# Patient Record
Sex: FEMALE
[De-identification: names, ages, dates, MRNs, and addresses within clinical notes are randomized per-mention and may not be internally consistent; named-entity substitution may affect disease eponyms.]

---

## 2018-03-06 ENCOUNTER — HOSPITAL ENCOUNTER (OUTPATIENT)
Dept: HOSPITAL 42 - ED | Age: 63
Setting detail: OBSERVATION
LOS: 2 days | Discharge: HOME | End: 2018-03-08
Attending: INTERNAL MEDICINE | Admitting: INTERNAL MEDICINE
Payer: COMMERCIAL

## 2018-03-06 VITALS — BODY MASS INDEX: 29.2 KG/M2

## 2018-03-06 DIAGNOSIS — R13.10: ICD-10-CM

## 2018-03-06 DIAGNOSIS — T18.128A: Primary | ICD-10-CM

## 2018-03-06 DIAGNOSIS — Z82.3: ICD-10-CM

## 2018-03-06 DIAGNOSIS — E04.1: ICD-10-CM

## 2018-03-06 DIAGNOSIS — R40.2412: ICD-10-CM

## 2018-03-06 DIAGNOSIS — F17.210: ICD-10-CM

## 2018-03-06 DIAGNOSIS — R91.1: ICD-10-CM

## 2018-03-06 DIAGNOSIS — J45.909: ICD-10-CM

## 2018-03-06 PROCEDURE — 74220 X-RAY XM ESOPHAGUS 1CNTRST: CPT

## 2018-03-06 PROCEDURE — 99285 EMERGENCY DEPT VISIT HI MDM: CPT

## 2018-03-06 PROCEDURE — 80053 COMPREHEN METABOLIC PANEL: CPT

## 2018-03-06 PROCEDURE — 85610 PROTHROMBIN TIME: CPT

## 2018-03-06 PROCEDURE — 96367 TX/PROPH/DG ADDL SEQ IV INF: CPT

## 2018-03-06 PROCEDURE — 85730 THROMBOPLASTIN TIME PARTIAL: CPT

## 2018-03-06 PROCEDURE — 85027 COMPLETE CBC AUTOMATED: CPT

## 2018-03-06 PROCEDURE — 36415 COLL VENOUS BLD VENIPUNCTURE: CPT

## 2018-03-06 PROCEDURE — 85025 COMPLETE CBC W/AUTO DIFF WBC: CPT

## 2018-03-06 PROCEDURE — 96365 THER/PROPH/DIAG IV INF INIT: CPT

## 2018-03-06 PROCEDURE — 96366 THER/PROPH/DIAG IV INF ADDON: CPT

## 2018-03-06 PROCEDURE — 93005 ELECTROCARDIOGRAM TRACING: CPT

## 2018-03-06 PROCEDURE — 96375 TX/PRO/DX INJ NEW DRUG ADDON: CPT

## 2018-03-06 PROCEDURE — 70490 CT SOFT TISSUE NECK W/O DYE: CPT

## 2018-03-06 PROCEDURE — 71260 CT THORAX DX C+: CPT

## 2018-03-06 NOTE — ED PDOC
Arrival/HPI





- General


Chief Complaint: Shortness Of Breath


Time Seen by Provider: 18 23:19


Historian: Patient





- History of Present Illness


Narrative History of Present Illness (Text): 


18 23:34


Mine Pina is a 62 year old female who presents to the Emergency 

department complaining of a foreign body sensation in her throat tonight. 

Patient states she was eating a steak at 22:15 tonight, when a piece became 

stuck in her throat. Patient unable to swallow her own spit or liquids. Patient 

states this has happened to her previously. Patient denies any fever, chills, 

chest pain, shortness of breath, neck pain, headache, dizziness, or any other 

complaints.





Time/Duration: 1-3 hours


Symptom Onset: Sudden


Symptom Course: Unchanged


Activities at Onset: Eating


Context: Home





Past Medical History





- Provider Review


Nursing Documentation Reviewed: Yes





- Infectious Disease


Hx of Infectious Diseases: None





- Pulmonary


Hx Asthma: Yes





- Psychiatric


Hx Substance Use: No





- Surgical History


Hx  Section: Yes (x1)





Family/Social History





- Physician Review


Nursing Documentation Reviewed: Yes


Family/Social History: Unknown Family HX


Smoking Status: Light Smoker < 10 Cigarettes Daily


Hx Alcohol Use: Yes


Frequency of alcohol use: Socially


Hx Substance Use: No





Allergies/Home Meds


Allergies/Adverse Reactions: 


Allergies





No Known Allergies Allergy (Verified 18 23:03)


 








Home Medications: 


 Home Meds











 Medication  Instructions  Recorded  Confirmed


 


No Known Home Med  18














Review of Systems





- Physician Review


All systems were reviewed & negative as marked: Yes





- Review of Systems


Constitutional: Normal.  absent: Fevers


Eyes: Normal


ENT: Other (+foreign body sensation in throat)


Respiratory: Normal.  absent: SOB, Cough


Cardiovascular: Normal.  absent: Chest Pain


Gastrointestinal: Normal.  absent: Abdominal Pain, Diarrhea, Nausea, Vomiting


Genitourinary Female: Normal.  absent: Dysuria, Frequency, Hematuria, Urine 

Output Changes


Musculoskeletal: Normal.  absent: Back Pain


Skin: Normal.  absent: Rash


Neurological: Normal.  absent: Headache, Dizziness


Endocrine: Normal


Hemo/Lymphatic: Normal


Psychiatric: Normal





Physical Exam


Vital Signs Reviewed: Yes


Vital Signs











  Temp Pulse Resp BP Pulse Ox


 


 18 23:54  98.3 F  79  18  145/75  100











Temperature: Afebrile


Blood Pressure: Normal


Pulse: Regular


Respiratory Rate: Normal


Appearance: Positive for: Well-Appearing, Non-Toxic, Comfortable


Pain Distress: None


Mental Status: Positive for: Alert and Oriented X 3





- Systems Exam


Head: Present: Atraumatic, Normocephalic


Pupils: Present: PERRL


Extroacular Muscles: Present: EOMI


Conjunctiva: Present: Normal


Mouth: Present: Moist Mucous Membranes


Pharnyx: Present: Normal.  No: ERYTHEMA, EXUDATE, TONSILS ENLARGED, 

Peritonsilar Swelling, Uvular Deviation, Muffled/Hoarse Voice, Strider, Soft 

Palate/Uvular Edema


Neck: Present: Normal Range of Motion.  No: Meningeal Signs, MIDLINE TENDERNESS

, Paraspinal Tenderness


Respiratory/Chest: Present: Clear to Auscultation, Good Air Exchange.  No: 

Respiratory Distress, Accessory Muscle Use


Cardiovascular: Present: Regular Rate and Rhythm, Normal S1, S2.  No: Murmurs


Abdomen: Present: Normal Bowel Sounds.  No: Tenderness, Distention, Peritoneal 

Signs


Back: Present: Normal Inspection.  No: CVA Tenderness, Midline Tenderness, 

Paraspinal Tenderness


Upper Extremity: Present: Normal Inspection.  No: Cyanosis, Edema


Lower Extremity: Present: Normal Inspection.  No: Edema


Neurological: Present: GCS=15, CN II-XII Intact, Speech Normal


Skin: Present: Warm, Dry, Normal Color.  No: Rashes


Psychiatric: Present: Alert, Oriented x 3, Normal Insight, Normal Concentration





Medical Decision Making


ED Course and Treatment: 


18 23:34


Impression:


62 year old female complaining of foreign body sensation in throat after eating 

at 22:15 tonight.





Plan:


-- Labs


-- IV fluids


-- Glucagon


-- Reassess and disposition








Progress Notes:


18 00:36


Case discussed with Dr. Larson, covering for Dr. Staley, who is aware and agrees 

with plan. 





18 00:45


On re-evaluation, pt states she was able to swallow the food bolus. Pt now able 

to tolerate PO intake without difficulty. Paged Dr. Larson.





18 00:50


Spoke again with Dr. Larson, recommends pt stay overnight for further 

evaluation. Pt agrees with plan. Pt will undergo CT Neck Soft Tissues in the 

interim.





18 02:46


Case discussed with medical resident on call, who is aware and agrees with 

plan. 


Case discussed with Dr. Prater, who is aware and agrees with plan. Accepts pt in 

to hospitalist service.





18 03:24


CT Neck Soft Tissues shows:


Nasopharynx: No acute abnormality.


Oropharynx: No significant tonsillar enlargement.


Hypopharynx: No acute abnormality.


Larynx: Normal epiglottis.


Trachea: Posterior lateral to the trachea, there is a collection of gas 

measuring 0.9 x 1.0 cm,


consistent with a paratracheal air cyst.


Retropharyngeal space: No acute abnormality.


Submandibular/parotid glands: Subcentimeter intraparotid lymph nodes are 

visualized. No


significant swelling of the submandibular or parotid glands.


Thyroid: Within the right thyroid lobe, there is a 0.9 x 0.9 x 1.2 cm hypodense 

nodule.


Bones/joints: Spondylosis is visualized at multiple cervical levels. There is 

slight reversal of the


lordotic curvature of the cervical spine.


Soft tissues: No hyperdense foreign body is identified within the visualized 

aerodigestive tract.


Vasculature: Evaluation of the vasculature is suboptimal due the absence of 

intravenous contrast.


Mild atherosclerotic changes are visualized.


Lymph nodes: Scattered small cervical lymph nodes are identified, without 

significant cervical


lymphadenopathy.


Esophagus: There is a subcentimeter focus of gas posterior to the proximal 

esophagus. Differential


considerations include infection and perforation.


Lung apices: Patchy nonspecific groundglass densities identified within the 

lungs bilaterally.


IMPRESSION:


1. No hyperdense foreign body is identified within the visualized aerodigestive 

tract.


2. There is a subcentimeter focus of gas posterior to the proximal esophagus. 

Differential


considerations include infection and perforation. Clinical correlation and 

possible upper endoscopy is


recommended.


3. Within the right thyroid lobe, there is a 0.9 x 0.9 x 1.2 cm hypodense 

nodule. Nonemergent


ultrasonography is recommended.


4. Posterolateral to the trachea, there is a collection of gas measuring 0.9 x 

1.0 cm, consistent with a


paratracheal air cyst.


5. Incidental/non-acute findings are described above.





18 03:58


Dr. Ronquillo made aware of CT scan results. Pt will undergo endoscopy later today.





- Lab Interpretations


Lab Results: 








 18 00:10 





 18 00:10 





 Lab Results





18 00:10: WBC 9.1, RBC 4.24, Hgb 14.1, Hct 42.3, MCV 99.8, MCH 33.3, MCHC 

33.3, RDW 13.7, Plt Count 261, MPV 11.5 H


18 00:10: Sodium 143, Potassium 3.9, Chloride 110 H, Carbon Dioxide 22, 

Anion Gap 15, BUN 9, Creatinine 1.2, Est GFR (African Amer) 55, Est GFR (Non-Af 

Amer) 46, Random Glucose 128 H, Calcium 9.6, Total Bilirubin 0.3, AST 22, ALT 32

, Alkaline Phosphatase 63, Total Protein 7.3, Albumin 4.0, Globulin 3.3, Albumin

/Globulin Ratio 1.2


18 00:10: PT 11.8, INR 1.03, APTT 28.7








I have reviewed the lab results: Yes





- RAD Interpretation


: Radiologist





- Medication Orders


Current Medication Orders: 








Acetaminophen (Tylenol 325mg Tab)  650 mg PO Q6H PRN


   PRN Reason: Fever >100.4 F


Sodium Chloride (Sodium Chloride 0.9%)  1,000 mls @ 100 mls/hr IV .Q10H [facility]


   Last Admin: 18 00:03  Dose: 100 mls/hr





eMAR Start Stop


 Document     18 00:03  AD  (Rec: 18 00:04  AD  [id number])


     Intravenous Solution


      Start Date                                 18


      Start Time                                 00:04








Discontinued Medications





Glucagon (Glucagen Diagnostic Kit)  1 mg IV STAT STA


   Stop: 18 23:39


   Last Admin: 18 00:06  Dose: 1 mg





eMAR Start Stop


 Document     18 00:06  AD  (Rec: 18 00:06  AD  [id number])


     Intravenous Solution


      Start Date                                 18


      Start Time                                 00:06














- Scribe Statement


The provider has reviewed the documentation as recorded by the Hola Last





Provider Scribe Attestation:


All medical record entries made by the Scribe were at my direction and 

personally dictated by me. I have reviewed the chart and agree that the record 

accurately reflects my personal performance of the history, physical exam, 

medical decision making, and the department course for this patient. I have 

also personally directed, reviewed, and agree with the discharge instructions 

and disposition.








Disposition/Present on Arrival





- Present on Arrival


Any Indicators Present on Arrival: No


History of DVT/PE: No


History of Uncontrolled Diabetes: No


Urinary Catheter: No


History of Decub. Ulcer: No


History Surgical Site Infection Following: None





- Disposition


Have Diagnosis and Disposition been Completed?: Yes


Diagnosis: 


 Food impaction of esophagus





Disposition: HOSPITALIZED


Disposition Time: 02:50


Patient Plan: Observation


Patient Problems: 


 Current Active Problems











Problem Status Onset


 


Food impaction of esophagus Acute  











Condition: STABLE

## 2018-03-07 VITALS — TEMPERATURE: 98.1 F

## 2018-03-07 LAB
ALBUMIN SERPL-MCNC: 4 G/DL (ref 3–4.8)
ALBUMIN/GLOB SERPL: 1.2 {RATIO} (ref 1.1–1.8)
ALT SERPL-CCNC: 32 U/L (ref 7–56)
APTT BLD: 28.7 SECONDS (ref 25.1–36.5)
AST SERPL-CCNC: 22 U/L (ref 14–36)
BUN SERPL-MCNC: 9 MG/DL (ref 7–21)
CALCIUM SERPL-MCNC: 9.6 MG/DL (ref 8.4–10.5)
ERYTHROCYTE [DISTWIDTH] IN BLOOD BY AUTOMATED COUNT: 13.7 % (ref 11.5–14.5)
GFR NON-AFRICAN AMERICAN: 46
HGB BLD-MCNC: 14.1 G/DL (ref 12–16)
INR PPP: 1.03 (ref 0.93–1.08)
MCH RBC QN AUTO: 33.3 PG (ref 25–35)
MCHC RBC AUTO-ENTMCNC: 33.3 G/DL (ref 31–37)
MCV RBC AUTO: 99.8 FL (ref 80–105)
PLATELET # BLD: 261 10^3/UL (ref 120–450)
PMV BLD AUTO: 11.5 FL (ref 7–11)
PROTHROMBIN TIME: 11.8 SECONDS (ref 9.4–12.5)
RBC # BLD AUTO: 4.24 10^6/UL (ref 3.5–6.1)
WBC # BLD AUTO: 9.1 10^3/UL (ref 4.5–11)

## 2018-03-07 RX ADMIN — POTASSIUM CHLORIDE, DEXTROSE MONOHYDRATE AND SODIUM CHLORIDE SCH MLS/HR: 150; 5; 450 INJECTION, SOLUTION INTRAVENOUS at 16:33

## 2018-03-07 NOTE — CP.PCM.CON
<Felecia Larson - Last Filed: 18 18:49>





History of Present Illness





- History of Present Illness


History of Present Illness: 


GI Fellow PGY 4 Consult Note


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 

presents for steak stuck in her throat last night. Pt states that she was 

eating steak at 10:15 pm, when a piece got stuck in her throat. Pt unable to 

swallow her saliva or liquids, denies respiratory distress, nausea, vomiting, 

abdominal pain, diarrhea, hemoptysis, fever, chills, cp, sob, urinary symptoms. 

Pt reports prior such episodes, last one was last year which did not require 

EGD but got better after glugacon. Pt reports she needed and EGD in [place] 

for similar episode a few years ago. Pt was able to swallow steak in the ER not 

requiring emergent EGD. CT soft tissue neck significant for a subcentimeter 

focus of gas posterior to the proximal esophagus. GI consulted, recommend 

overnight observation. Pt feels better with no pain and able to swallow saliva. 





ROS: A 12pt ROS was negative except as above


PMH: denies


PSH: C sections


FH: no colon or esophageal cancer 


SH: Smokes 1/4-1/ ppd x 43 years. Drinks every other weekend, 2-3 beers. 

Denies recreational drug use. 








Past Patient History





- Infectious Disease


Hx of Infectious Diseases: None





- Past Social History


Smoking Status: Light Smoker < 10 Cigarettes Daily





- PULMONARY


Hx Asthma: Yes





- MUSCULOSKELETAL/RHEUMATOLOGICAL


Hx Falls: No





- PSYCHIATRIC


Hx Substance Use: No





- SURGICAL HISTORY


Hx  Section: Yes (x1)





Meds


Allergies/Adverse Reactions: 


 Allergies











Allergy/AdvReac Type Severity Reaction Status Date / Time


 


No Known Allergies Allergy   Verified 18 23:03














- Medications


Medications: 


 Current Medications





Acetaminophen (Tylenol 325mg Tab)  650 mg PO Q6H PRN


   PRN Reason: Fever >100.4 F


Famotidine (Pepcid)  20 mg IVP DAILY [facility]


   Last Admin: 18 10:01 Dose:  20 mg


Ampicillin Sodium/Sulbactam (Sodium 3 gm/ Sodium Chloride)  100 mls @ 200 mls/

hr IVPB Q6 [facility]


   PRN Reason: Protocol


   Last Admin: 18 11:09 Dose:  200 mls/hr


Potassium Chloride/Dextrose/Sod Cl (Potassium Chl 20 Meq In D5-1/2ns)  1,000 

mls @ 100 mls/hr IV .Q10H [facility]











Physical Exam





- Constitutional


Appears: Non-toxic, No Acute Distress





- Head Exam


Head Exam: ATRAUMATIC, NORMAL INSPECTION, NORMOCEPHALIC





- Eye Exam


Eye Exam: EOMI, Normal appearance


Pupil Exam: NORMAL ACCOMODATION





- ENT Exam


ENT Exam: Mucous Membranes Moist





- Neck Exam


Neck exam: Positive for: Normal Inspection





- Respiratory Exam


Respiratory Exam: Clear to Auscultation Bilateral, NORMAL BREATHING PATTERN





- Cardiovascular Exam


Cardiovascular Exam: REGULAR RHYTHM





- GI/Abdominal Exam


GI & Abdominal Exam: Normal Bowel Sounds, Soft.  absent: Distended, Guarding





- Extremities Exam


Extremities exam: Positive for: full ROM, normal inspection





- Back Exam


Back exam: NORMAL INSPECTION





- Neurological Exam


Neurological exam: Alert, Oriented x3





- Psychiatric Exam


Psychiatric exam: Normal Affect, Normal Mood





- Skin


Skin Exam: Dry, Intact, Normal Color, Warm





Results





- Vital Signs


Recent Vital Signs: 


 Last Vital Signs











Temp  98 F   18 08:09


 


Pulse  60   18 08:09


 


Resp  16   18 08:09


 


BP  129/70   18 08:09


 


Pulse Ox  100   18 08:09














- Labs


Result Diagrams: 


 18 00:10





 18 00:10





Assessment & Plan





- Assessment and Plan (Free Text)


Assessment: 


This is a 62yF presenting with steak stuck in her throat.


1. Food impaction with resolution, recurrent


2. Gas/air proximal esophagous ddx perforation, diverticulum, fistula





Plan: 


-Continue supportive care


-NPO


-No plan for EGD with possible air in esophagous, concern for perforation, 

diverticulum or fistula


-CT chest for further evaluation and possible barium esophagram


-Recommend cardiothoracic surgery cs


-Will make further recommendations after imaging


-Pt with recurrent episodes food impaction, will need esophgeal biopsy at some 

point to r/o EOE


-Will continue to follow closely








<Nan Staley - Last Filed: 18 20:22>





Meds





- Medications


Medications: 


 Current Medications





Acetaminophen (Tylenol 325mg Tab)  650 mg PO Q6H PRN


   PRN Reason: Fever >100.4 F


Famotidine (Pepcid)  20 mg IVP DAILY [facility]


   Last Admin: 18 10:01 Dose:  20 mg


Ampicillin Sodium/Sulbactam (Sodium 3 gm/ Sodium Chloride)  100 mls @ 200 mls/

hr IVPB Q6 [facility]


   PRN Reason: Protocol


   Last Admin: 18 17:48 Dose:  200 mls/hr


Potassium Chloride/Dextrose/Sod Cl (Potassium Chl 20 Meq In D5-1/2ns)  1,000 

mls @ 100 mls/hr IV .Q10H [facility]


   Last Admin: 18 16:33 Dose:  100 mls/hr











Results





- Vital Signs


Recent Vital Signs: 


 Last Vital Signs











Temp  98.1 F   18 14:00


 


Pulse  54 L  18 14:00


 


Resp  18   18 14:00


 


BP  141/68   18 14:00


 


Pulse Ox  97   18 14:00














- Labs


Result Diagrams: 


 18 00:10





 18 00:10





Attending/Attestation





- Attestation


I have personally seen and examined this patient.: Yes


I have fully participated in the care of the patient.: Yes


I have reviewed all pertinent clinical information: Yes


Notes (Text): 





18 20:10


Patient seen and examined with GI fellow on rounds. This is a 62 year old F 

presenting with esophageal food bolus which she passed after glucagon 

injection. CT soft tissue and ct neck showe dsmall air in proximal esophagus 

with likely diverticulum. Concern for perforation or fistula mandated Barium 

esophagogram that was normal. Can benefit from EGD with esophageal biopsies. 

Clear liquid diet and advance as tolerated. PPI daily











18 20:22

## 2018-03-07 NOTE — CT
PROCEDURE:  CT Chest with contrast



HISTORY:

concern for proximal esophageal gas, IV contrast only



COMPARISON:

March 7, 2018. CT neck



TECHNIQUE:

Contiguous axial images were obtained through the chest with 

intravenous contrast enhancement. Sagittal and coronal 

reconstructions were performed.



IV contrast: 100 cc Omnipaque 300



Radiation dose (DLP): 337.57 mGy-cm.



This CT exam was performed using one or more of the following dose 

reduction techniques: Automated exposure control, adjustment of the 

mA and/or kV according to patient size, and/or use of iterative 

reconstruction technique.



FINDINGS:



LUNGS:

9.4 mm pulmonary nodule lateral segment right lower lobe.



Incidental finding(s):



4 mm calcified granuloma at anterior segment right upper lobe 



MEDIASTINUM:

Unremarkable thoracic aorta. No aneurysm or dissection. Normal sized 

heart. Main pulmonary artery unremarkable. No vascular congestion. No 

lymphadenopathy.



PLEURA:

No pleural fluid. No pneumothorax.



BONES:

No fracture. No destructive lesion. 



UPPER ABDOMEN:

Grossly unremarkable.



OTHER FINDINGS:

Solitary thyroid nodule right lobe.  Elective ultrasound recommended



IMPRESSION:

1. Small focus of air confirmed on the current study within the 

superior mediastinum. This is separate from the trachea. This may 

represent an esophageal diverticulum.  There is no evidence of 

dissection of air, pneumomediastinum, subcutaneous emphysema. 



2. Solitary pulmonary nodule 9.4 mm.



Follow-up recommendation:



Low risk, consider CT at 3 months, PET-CT, tissue sampling.



High risk consider CT at 3 months, PET-CT, tissue sampling.



Additional benign and/or incidental findings described above.



This includes a nodule in the right thyroid lobe for which elective 

ultrasound is recommended

## 2018-03-07 NOTE — CP.PCM.CON
History of Present Illness





- History of Present Illness


History of Present Illness: 





Cardiothoracic Surgery: Dr. Brown





62F with history of recurrent food impaction presents to [facility] after having 

sensation of having food stuck in her throat. Patient states she was eating 

steak, believe she didn't chew it enough times before swallowing it and felt as 

if the steak was stuck in her throat. Patient currently denies fever/chills, 

odynophagia, hematemesis, diarrhea. Patient reports her last endoscopy was 

about 5-6 years ago and it was done over in [place], however, patient 

states she does not remember the results. Currently she is laying in bed 

comfortably with no complaints.





PMH: dysphagia


PSH: 


FHx: noncontributory


SHx: Smokes 1/2 ppd x 43 years.





Review of Systems





- Review of Systems


Review of Systems: 





12 pt ROS unremarkable, except as stated in HPI





Past Patient History





- Infectious Disease


Hx of Infectious Diseases: None





- Past Social History


Smoking Status: Light Smoker < 10 Cigarettes Daily





- PULMONARY


Hx Asthma: Yes





- MUSCULOSKELETAL/RHEUMATOLOGICAL


Hx Falls: No





- PSYCHIATRIC


Hx Substance Use: No





- SURGICAL HISTORY


Hx  Section: Yes (x1)





Meds


Allergies/Adverse Reactions: 


 Allergies











Allergy/AdvReac Type Severity Reaction Status Date / Time


 


No Known Allergies Allergy   Verified 18 23:03














- Medications


Medications: 


 Current Medications





Acetaminophen (Tylenol 325mg Tab)  650 mg PO Q6H PRN


   PRN Reason: Fever >100.4 F


Famotidine (Pepcid)  20 mg IVP DAILY [facility]


Sodium Chloride (Sodium Chloride 0.9%)  1,000 mls @ 100 mls/hr IV .Q10H [facility]


   Last Admin: 18 00:03 Dose:  100 mls/hr











Physical Exam





- Constitutional


Appears: No Acute Distress





- Head Exam


Head Exam: NORMOCEPHALIC





- Eye Exam


Eye Exam: Normal appearance





- Neck Exam


Additional comments: 





no crepitus





- Respiratory Exam


Respiratory Exam: NORMAL BREATHING PATTERN





- Cardiovascular Exam


Cardiovascular Exam: +S1, +S2





- GI/Abdominal Exam


GI & Abdominal Exam: Soft.  absent: Tenderness





- Extremities Exam


Extremities exam: Positive for: normal inspection





- Neurological Exam


Neurological exam: Alert, Oriented x3





- Psychiatric Exam


Psychiatric exam: Normal Mood





- Skin


Skin Exam: Dry, Normal Color, Warm





Results





- Vital Signs


Recent Vital Signs: 


 Last Vital Signs











Temp  98 F   18 08:09


 


Pulse  60   18 08:09


 


Resp  16   18 08:09


 


BP  129/70   18 08:09


 


Pulse Ox  100   18 08:09














- Labs


Result Diagrams: 


 18 00:10





 18 00:10





Assessment & Plan





- Assessment and Plan (Free Text)


Assessment: 





62F w/ history of dysphagia and esophageal food impaction 





Plan: 





-NPO


-Abx


-ABx


-F/ u CT chest results


-Esophagram


-DVT/GI PPx


-Ok from surgical standpoint to move forward with endoscopy


-Will continue to follow


-D/w Dr. Stephanie Franz PGY2








- Date & Time


Date: 18


Time: 09:30

## 2018-03-07 NOTE — RAD
HISTORY:

Dysphagia.



COMPARISON:

CT of the chest same day



TECHNIQUE:

Single contrast water soluble esophagram was performed.



FINDINGS:

Patient tolerated procedure well.



ESOPHAGUS:

Esophageal mucosa appeared preserved. No evidence of stricture or 

mass lesion. No evidence of perforation. There is no diverticulum 

demonstrated. 



HIATAL HERNIA:

None demonstrated.



GASTROESOPHAGEAL REFLUX:

Not demonstrated.



OTHER FINDINGS:

None.



IMPRESSION:

No evidence of esophageal obstruction or perforation. There is no 

diverticulum demonstrated.



The finding on CT may be too small to demonstrate on esophagram

## 2018-03-07 NOTE — CT
EXAM:

  CT Neck Without Intravenous Contrast



EXAM DATE/TIME:

  3/7/2018 1:09 AM



CLINICAL HISTORY:

  The patient age is 62 years old and is female; Signs and symptoms; Dysphagia 

/ difficulty swallowing; Additional info: R/O foreign body Facility exam id and 

description: Ct necks neck soft tissue w/o contrast



TECHNIQUE:

  Axial computed tomography images of the neck without intravenous contrast.  

All CT scans at this facility use one or more dose reduction techniques, viz.: 

automated exposure control; ma/kV adjustment per patient size (including 

targeted exams where dose is matched to indication; i.e. head); or iterative 

reconstruction technique.

  Coronal and sagittal reformatted images were created and reviewed.



COMPARISON:

  No relevant prior studies available.



FINDINGS:

  Nasopharynx:  No acute abnormality.

  Oropharynx:  No significant tonsillar enlargement.

  Hypopharynx:  No acute abnormality.

  Larynx:  Normal epiglottis.

  Trachea:  Posterior lateral to the trachea, there is a collection of gas 

measuring 0.9 x 1.0 cm, consistent with a paratracheal air cyst.

  Retropharyngeal space:  No acute abnormality.

  Submandibular/parotid glands:  Subcentimeter intraparotid lymph nodes are 

visualized. No significant swelling of the submandibular or parotid glands.

  Thyroid:  Within the right thyroid lobe, there is a 0.9 x 0.9 x 1.2 cm 

hypodense nodule.

  Bones/joints:  Spondylosis is visualized at multiple cervical levels. There 

is slight reversal of the lordotic curvature of the cervical spine.

  Soft tissues:  No hyperdense foreign body is identified within the visualized 

aerodigestive tract.

  Vasculature:  Evaluation of the vasculature is suboptimal due the absence of 

intravenous contrast. Mild atherosclerotic changes are visualized.

  Lymph nodes:  Scattered small cervical lymph nodes are identified, without 

significant cervical lymphadenopathy.

  Esophagus:  There is a subcentimeter focus of gas posterior to the proximal 

esophagus. Differential considerations include infection and perforation.

  Lung apices:  Patchy nonspecific groundglass densities identified within the 

lungs bilaterally.



IMPRESSION:     

1.  No hyperdense foreign body is identified within the visualized 

aerodigestive tract.

2.  There is a subcentimeter focus of gas posterior to the proximal esophagus. 

Differential considerations include infection and perforation.  Clinical 

correlation and possible upper endoscopy is recommended.

3.  Within the right thyroid lobe, there is a 0.9 x 0.9 x 1.2 cm hypodense 

nodule.  Nonemergent ultrasonography is recommended.

4.  Posterolateral to the trachea, there is a collection of gas measuring 0.9 x 

1.0 cm, consistent with a paratracheal air cyst.

5.  Incidental/non-acute findings are described above.

## 2018-03-07 NOTE — CARD
--------------- APPROVED REPORT --------------





EKG Measurement

Heart [id number]

VA [id number]

[id number]

[id number]

[id number]



<Conclusion>

Sinus bradycardia

Otherwise normal ECG

## 2018-03-07 NOTE — CP.PCM.HP
History of Present Illness





- History of Present Illness


History of Present Illness: 





Cristina Coates, PGY1, Medicine H&P for Dr Prater:





CC: food stuck down the throat





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 

for a foreign body sensation in her throat tonight. Pt states that she was 

eating steak at 10:15 pm, when a piece got stuck in her throat. Pt unable to 

swallow her saliva or liquids, denies respiratory distress, nausea, vomiting, 

abdominal pain, diarrhea, hemoptysis, fever, chills, cp, sob, urinary symptoms. 

Pt reports prior such episodes, [place] [facility] [place] 

endoscopy to push the food down her throat. 





In ED, vitals stable, CT soft tissue neck significant for a subcentimeter focus 

of gas posterior to the proximal esophagus, perforation? GI consulted, 

recommends overnight observation for an upper endoscopy tomorrow.





[facility] [place] (recently got insurance, havent seen physician yet)


PMH: denies


PSH: C sections


All: NKA


FH: Father, renal cancer?


Mother, CVA


SH: Smokes 1/4-1/2 ppd x 43 years. Drinks every other weekend, 2-3 beers. 

Denies recreational drug use. Lives with friend. Walks independently.








Present on Admission





- Present on Admission


Any Indicators Present on Admission: No


History of DVT/PE: No


History of Uncontrolled Diabetes: No


Urinary Catheter: No


Decubitus Ulcer Present: No





Review of Systems





- Review of Systems


All systems: reviewed and no additional remarkable complaints except


Review of Systems: 





as per HPI





Past Patient History





- Infectious Disease


Hx of Infectious Diseases: None





- Past Social History


Smoking Status: Light Smoker < 10 Cigarettes Daily





- PULMONARY


Hx Asthma: Yes





- PSYCHIATRIC


Hx Substance Use: No





- SURGICAL HISTORY


Hx  Section: Yes (x1)





Meds


Allergies/Adverse Reactions: 


 Allergies











Allergy/AdvReac Type Severity Reaction Status Date / Time


 


No Known Allergies Allergy   Verified 18 23:03














Physical Exam





- Constitutional


Appears: Non-toxic, No Acute Distress





- Head Exam


Head Exam: ATRAUMATIC, NORMOCEPHALIC





- Eye Exam


Eye Exam: EOMI, PERRL.  absent: Conjunctival injection, Nystagmus, Scleral 

icterus


Pupil Exam: NORMAL ACCOMODATION, PERRL.  absent: Fixed, Irregular, Unequal





- ENT Exam


ENT Exam: Mucous Membranes Moist





- Neck Exam


Neck exam: Positive for: Full Rom





- Respiratory Exam


Respiratory Exam: Clear to Auscultation Bilateral.  absent: Accessory Muscle Use

, Chest Wall Tenderness, Rales, Rhonchi, Wheezes, Stridor, NORMAL BREATHING 

PATTERN





- Cardiovascular Exam


Cardiovascular Exam: RRR, +S1, +S2.  absent: Systolic Murmur





- GI/Abdominal Exam


GI & Abdominal Exam: Normal Bowel Sounds, Soft.  absent: Diminished Bowel Sounds

, Distended, Firm, Guarding, Mass, Pulsatile Mass, Rebound, Rigid, Tenderness





- Extremities Exam


Extremities exam: Positive for: normal inspection.  Negative for: calf 

tenderness, pedal edema





- Back Exam


Back exam: NORMAL INSPECTION





- Neurological Exam


Neurological exam: Alert, Oriented x3





- Psychiatric Exam


Psychiatric exam: Normal Affect, Normal Mood





- Skin


Skin Exam: Dry, Normal Color, Warm





Results





- Vital Signs


Recent Vital Signs: 





 Last Vital Signs











Temp  98.3 F   18 23:54


 


Pulse  79   18 23:54


 


Resp  18   18 23:54


 


BP  145/75   18 23:54


 


Pulse Ox  100   18 23:54














- Labs


Result Diagrams: 


 18 00:10





 18 00:10


Labs: 





 Laboratory Results - last 24 hr











  18





  00:10 00:10 00:10


 


WBC    9.1


 


RBC    4.24


 


Hgb    14.1


 


Hct    42.3


 


MCV    99.8


 


MCH    33.3


 


MCHC    33.3


 


RDW    13.7


 


Plt Count    261


 


MPV    11.5 H


 


PT  11.8  


 


INR  1.03  


 


APTT  28.7  


 


Sodium   143 


 


Potassium   3.9 


 


Chloride   110 H 


 


Carbon Dioxide   22 


 


Anion Gap   15 


 


BUN   9 


 


Creatinine   1.2 


 


Est GFR ( Amer)   55 


 


Est GFR (Non-Af Amer)   46 


 


Random Glucose   128 H 


 


Calcium   9.6 


 


Total Bilirubin   0.3 


 


AST   22 


 


ALT   32 


 


Alkaline Phosphatase   63 


 


Total Protein   7.3 


 


Albumin   4.0 


 


Globulin   3.3 


 


Albumin/Globulin Ratio   1.2 














Assessment & Plan





- Assessment and Plan (Free Text)


Assessment: 





62 year old female with previous food impactions in esophagus, presents s/p 

food impaction tonight:





Food impaction in esophagus:


- No resp distress, afebrile, vitals stable


- CT soft tissue/neck: No hyperdense foreign body is identified within the 

visualized aerodigestive tract. There is a subcentimeter focus of gas posterior 

to the proximal esophagus. Differential considerations include infection and 

perforation.  Clinical correlation and possible upper endoscopy is recommended. 

Within the right thyroid lobe, there is a 0.9 x 0.9 x 1.2 cm hypodense nodule.  

Nonemergent ultrasonography is recommended.  Posterolateral to the trachea, 

there is a collection of gas measuring 0.9 x 1.0 cm, consistent with a 

paratracheal air cyst.


- Pt currently has passed down the steak and able to tolerate PO foods.


- GI consulted. F/u recs - upper endoscopy.


- Tylenol prn


- IVF @ 100


- NPO








PPX: Pepcid, SCDs, encouraged to ambulate





Discussed with Dr Prater.








- Date & Time


Date: 18


Time: 04:01

## 2018-03-08 VITALS
RESPIRATION RATE: 16 BRPM | DIASTOLIC BLOOD PRESSURE: 79 MMHG | SYSTOLIC BLOOD PRESSURE: 144 MMHG | OXYGEN SATURATION: 96 % | HEART RATE: 55 BPM

## 2018-03-08 LAB
ALBUMIN SERPL-MCNC: 3.9 G/DL (ref 3–4.8)
ALBUMIN/GLOB SERPL: 1.2 {RATIO} (ref 1.1–1.8)
ALT SERPL-CCNC: 31 U/L (ref 7–56)
AST SERPL-CCNC: 24 U/L (ref 14–36)
BASOPHILS # BLD AUTO: 0.08 K/MM3 (ref 0–2)
BASOPHILS NFR BLD: 1 % (ref 0–3)
BUN SERPL-MCNC: 5 MG/DL (ref 7–21)
CALCIUM SERPL-MCNC: 9.2 MG/DL (ref 8.4–10.5)
EOSINOPHIL # BLD: 0.3 10*3/UL (ref 0–0.7)
EOSINOPHIL NFR BLD: 3.7 % (ref 1.5–5)
ERYTHROCYTE [DISTWIDTH] IN BLOOD BY AUTOMATED COUNT: 13.5 % (ref 11.5–14.5)
GFR NON-AFRICAN AMERICAN: > 60
GRANULOCYTES # BLD: 4.73 10*3/UL (ref 1.4–6.5)
GRANULOCYTES NFR BLD: 59.5 % (ref 50–68)
HGB BLD-MCNC: 13.6 G/DL (ref 12–16)
LYMPHOCYTES # BLD: 2.3 10*3/UL (ref 1.2–3.4)
LYMPHOCYTES NFR BLD AUTO: 29.5 % (ref 22–35)
MCH RBC QN AUTO: 32.9 PG (ref 25–35)
MCHC RBC AUTO-ENTMCNC: 32.6 G/DL (ref 31–37)
MCV RBC AUTO: 100.7 FL (ref 80–105)
MONOCYTES # BLD AUTO: 0.5 10*3/UL (ref 0.1–0.6)
MONOCYTES NFR BLD: 6.3 % (ref 1–6)
PLATELET # BLD: 246 10^3/UL (ref 120–450)
PMV BLD AUTO: 11.2 FL (ref 7–11)
RBC # BLD AUTO: 4.14 10^6/UL (ref 3.5–6.1)
WBC # BLD AUTO: 7.9 10^3/UL (ref 4.5–11)

## 2018-03-08 RX ADMIN — POTASSIUM CHLORIDE, DEXTROSE MONOHYDRATE AND SODIUM CHLORIDE SCH MLS/HR: 150; 5; 450 INJECTION, SOLUTION INTRAVENOUS at 03:40

## 2018-03-08 RX ADMIN — POTASSIUM CHLORIDE, DEXTROSE MONOHYDRATE AND SODIUM CHLORIDE SCH: 150; 5; 450 INJECTION, SOLUTION INTRAVENOUS at 00:07

## 2018-03-08 NOTE — CP.PCM.PN
Subjective





- Date & Time of Evaluation


Date of Evaluation: 03/08/18


Time of Evaluation: 09:46





- Subjective


Subjective: 


CT Surgery progress note: Dr. Brown


Patient seen and examined at bedside, no new complaints.  Patient feeling 

better.





Objective





- Vital Signs/Intake and Output


Vital Signs (last 24 hours): 


 











Temp Pulse Resp BP Pulse Ox


 


 98.1 F   54 L  18   141/68   97 


 


 03/07/18 14:00  03/07/18 14:00  03/07/18 14:00  03/07/18 14:00  03/07/18 14:00








Intake and Output: 


 











 03/08/18 03/08/18





 06:59 18:59


 


Intake Total 180 


 


Balance 180 














- Medications


Medications: 


 Current Medications





Acetaminophen (Tylenol 325mg Tab)  650 mg PO Q6H PRN


   PRN Reason: Fever >100.4 F


Ampicillin Sodium/Sulbactam (Sodium 3 gm/ Sodium Chloride)  100 mls @ 200 mls/

hr IVPB Q6 [facility]


   PRN Reason: Protocol


   Last Admin: 03/08/18 06:05 Dose:  200 mls/hr


Potassium Chloride/Dextrose/Sod Cl (Potassium Chl 20 Meq In D5-1/2ns)  1,000 

mls @ 100 mls/hr IV .Q10H [facility]


   Last Admin: 03/08/18 03:40 Dose:  100 mls/hr


Pantoprazole Sodium (Protonix Ec Tab)  40 mg PO 0600 [facility]











- Labs


Labs: 


 





 03/08/18 08:30 





 03/08/18 08:30 





 











PT  11.8 SECONDS (9.4-12.5)   03/07/18  00:10    


 


INR  1.03  (0.93-1.08)   03/07/18  00:10    


 


APTT  28.7 Seconds (25.1-36.5)   03/07/18  00:10    














- Constitutional


Appears: Well





- Head Exam


Head Exam: ATRAUMATIC, NORMAL INSPECTION, NORMOCEPHALIC





- Eye Exam


Eye Exam: EOMI, Normal appearance, PERRL


Pupil Exam: NORMAL ACCOMODATION, PERRL





- ENT Exam


ENT Exam: Mucous Membranes Moist, Normal Exam





- Neck Exam


Neck Exam: Full ROM, Normal Inspection.  absent: Lymphadenopathy





- Respiratory Exam


Respiratory Exam: Clear to Ausculation Bilateral, NORMAL BREATHING PATTERN





- Cardiovascular Exam


Cardiovascular Exam: REGULAR RHYTHM, +S1, +S2.  absent: Murmur





- GI/Abdominal Exam


GI & Abdominal Exam: Soft, Normal Bowel Sounds.  absent: Tenderness





- Extremities Exam


Extremities Exam: Full ROM, Normal Capillary Refill, Normal Inspection.  absent

: Joint Swelling, Pedal Edema





- Back Exam


Back Exam: NORMAL INSPECTION





- Neurological Exam


Neurological Exam: Alert, Awake, CN II-XII Intact, Normal Gait, Oriented x3





- Psychiatric Exam


Psychiatric exam: Normal Affect, Normal Mood





- Skin


Skin Exam: Dry, Intact, Normal Color, Warm





Assessment and Plan





- Assessment and Plan (Free Text)


Assessment: 


62F w/ history of dysphagia and esophageal food impaction


Plan: 


-ABx


-DVT/GI PPx


-No further surgical intervention at this point, we will sign off at this time, 

please feel free to reconsult as necessary

## 2018-03-08 NOTE — CP.PCM.DIS
<Karel Reyna - Last Filed: 03/20/18 14:01>





Provider





- Provider


Date of Admission: 


03/07/18 00:22





Attending physician: 


Lakeshia Polanco MD





Primary care physician: 


Paty Riddle MD





Consults: 


Sylvia


Cardiothoracic-Stephanie





Time Spent in preparation of Discharge (in minutes): 75





Hospital Course





- Lab Results


Lab Results: 


 Most Recent Lab Values











WBC  7.9 10^3/ul (4.5-11.0)   03/08/18  08:30    


 


RBC  4.14 10^6/uL (3.5-6.1)   03/08/18  08:30    


 


Hgb  13.6 g/dL (12.0-16.0)   03/08/18  08:30    


 


Hct  41.7 % (36.0-48.0)   03/08/18  08:30    


 


MCV  100.7 fl (80.0-105.0)   03/08/18  08:30    


 


MCH  32.9 pg (25.0-35.0)   03/08/18  08:30    


 


MCHC  32.6 g/dl (31.0-37.0)   03/08/18  08:30    


 


RDW  13.5 % (11.5-14.5)   03/08/18  08:30    


 


Plt Count  246 10^3/uL (120.0-450.0)   03/08/18  08:30    


 


MPV  11.2 fl (7.0-11.0)  H  03/08/18  08:30    


 


Gran %  59.5 % (50.0-68.0)   03/08/18  08:30    


 


Lymph % (Auto)  29.5 % (22.0-35.0)   03/08/18  08:30    


 


Mono % (Auto)  6.3 % (1.0-6.0)  H  03/08/18  08:30    


 


Eos % (Auto)  3.7 % (1.5-5.0)   03/08/18  08:30    


 


Baso % (Auto)  1.0 % (0.0-3.0)   03/08/18  08:30    


 


Gran #  4.73  (1.4-6.5)   03/08/18  08:30    


 


Lymph # (Auto)  2.3  (1.2-3.4)   03/08/18  08:30    


 


Mono # (Auto)  0.5  (0.1-0.6)   03/08/18  08:30    


 


Eos # (Auto)  0.3  (0.0-0.7)   03/08/18  08:30    


 


Baso # (Auto)  0.08 K/mm3 (0.0-2.0)   03/08/18  08:30    


 


PT  11.8 SECONDS (9.4-12.5)   03/07/18  00:10    


 


INR  1.03  (0.93-1.08)   03/07/18  00:10    


 


APTT  28.7 Seconds (25.1-36.5)   03/07/18  00:10    


 


Sodium  142 mmol/L (132-148)   03/08/18  08:30    


 


Potassium  3.6 mmol/L (3.6-5.0)   03/08/18  08:30    


 


Chloride  108 mmol/L ()  H  03/08/18  08:30    


 


Carbon Dioxide  21 mmol/L (21-33)   03/08/18  08:30    


 


Anion Gap  17  (10-20)   03/08/18  08:30    


 


BUN  5 mg/dL (7-21)  L  03/08/18  08:30    


 


Creatinine  0.7 mg/dl (0.7-1.2)   03/08/18  08:30    


 


Est GFR ( Amer)  > 60   03/08/18  08:30    


 


Est GFR (Non-Af Amer)  > 60   03/08/18  08:30    


 


Random Glucose  190 mg/dL ()  H  03/08/18  08:30    


 


Calcium  9.2 mg/dL (8.4-10.5)   03/08/18  08:30    


 


Total Bilirubin  0.6 mg/dL (0.2-1.3)   03/08/18  08:30    


 


AST  24 U/L (14-36)   03/08/18  08:30    


 


ALT  31 U/L (7-56)   03/08/18  08:30    


 


Alkaline Phosphatase  58 U/L ()   03/08/18  08:30    


 


Total Protein  7.0 g/dL (5.8-8.3)   03/08/18  08:30    


 


Albumin  3.9 g/dL (3.0-4.8)   03/08/18  08:30    


 


Globulin  3.1 gm/dL  03/08/18  08:30    


 


Albumin/Globulin Ratio  1.2  (1.1-1.8)   03/08/18  08:30    














- Hospital Course


Hospital Course: 


This is a 62yF presenting with steak stuck in her throat. Imaging of her neck 

was done along with a chest CT. There was Gas/air proximal esophagous-no 

perforation or diverticulum on esophagram. GI and cardiothoracic were 

consulted. She was made NPO then a clear liquid diet which was advanced to soft 

diet. Patient was told to follow as an outpatient for EGD. She was told to take 

Protonix daily for 6 weeks, Eat soft diet avoid steak, food with bones or other 

food that is hard to swallow and  Please refrain from smoking tobacco.  Finally 

she was told to Follow up with PMD for lung nodule monitoring within 3 months. 

CT scan results were given to her to provide her PMD. 








Discharge Exam





- Head Exam


Head Exam: ATRAUMATIC, NORMAL INSPECTION, NORMOCEPHALIC





- Respiratory Exam


Respiratory Exam: Clear to PA & Lateral, NORMAL BREATHING PATTERN





- Cardiovascular Exam


Cardiovascular Exam: REGULAR RHYTHM, +S1, +S2





- GI/Abdominal Exam


GI & Abdominal Exam: Normal Bowel Sounds





- Neurological Exam


Neurological exam: Alert, Oriented x3





Discharge Plan





- Discharge Medications


Prescriptions: 


Pantoprazole [Protonix EC Tab] 40 mg PO 0600 #42 ect





- Follow Up Plan


Condition: STABLE


Disposition: HOME/ ROUTINE


Instructions:  Dysphagia, Soft Diet, Quitting Smoking


Additional Instructions: 


1. Take Protonix daily for 6 weeks





2. Eat soft diet avoid steak, food with bones or other food that is hard to 

swallow





3. Follow up with GI clinic for Outpatient Endoscopy in 6 weeks 





4. Please refrain from smoking tobacco 





5. Follow up with PMD for lung nodule monitoring within 3 months. CT scan 

results were given to you to provide to Primary medical doctor. 


Referrals: 


Nan Staley MD [Medical Doctor] - 


Paty Riddle MD [Primary Care Provider] - 


[facility] [Outside]





<Lakeshia Polanco - Last Filed: 03/20/18 14:30>





Provider





- Provider


Date of Admission: 


03/07/18 02:56





Attending physician: 


Lakeshia Polanco MD





Primary care physician: 


Paty Riddle MD








Hospital Course





- Lab Results


Lab Results: 


 Most Recent Lab Values











WBC  7.9 10^3/ul (4.5-11.0)   03/08/18  08:30    


 


RBC  4.14 10^6/uL (3.5-6.1)   03/08/18  08:30    


 


Hgb  13.6 g/dL (12.0-16.0)   03/08/18  08:30    


 


Hct  41.7 % (36.0-48.0)   03/08/18  08:30    


 


MCV  100.7 fl (80.0-105.0)   03/08/18  08:30    


 


MCH  32.9 pg (25.0-35.0)   03/08/18  08:30    


 


MCHC  32.6 g/dl (31.0-37.0)   03/08/18  08:30    


 


RDW  13.5 % (11.5-14.5)   03/08/18  08:30    


 


Plt Count  246 10^3/uL (120.0-450.0)   03/08/18  08:30    


 


MPV  11.2 fl (7.0-11.0)  H  03/08/18  08:30    


 


Gran %  59.5 % (50.0-68.0)   03/08/18  08:30    


 


Lymph % (Auto)  29.5 % (22.0-35.0)   03/08/18  08:30    


 


Mono % (Auto)  6.3 % (1.0-6.0)  H  03/08/18  08:30    


 


Eos % (Auto)  3.7 % (1.5-5.0)   03/08/18  08:30    


 


Baso % (Auto)  1.0 % (0.0-3.0)   03/08/18  08:30    


 


Gran #  4.73  (1.4-6.5)   03/08/18  08:30    


 


Lymph # (Auto)  2.3  (1.2-3.4)   03/08/18  08:30    


 


Mono # (Auto)  0.5  (0.1-0.6)   03/08/18  08:30    


 


Eos # (Auto)  0.3  (0.0-0.7)   03/08/18  08:30    


 


Baso # (Auto)  0.08 K/mm3 (0.0-2.0)   03/08/18  08:30    


 


PT  11.8 SECONDS (9.4-12.5)   03/07/18  00:10    


 


INR  1.03  (0.93-1.08)   03/07/18  00:10    


 


APTT  28.7 Seconds (25.1-36.5)   03/07/18  00:10    


 


Sodium  142 mmol/L (132-148)   03/08/18  08:30    


 


Potassium  3.6 mmol/L (3.6-5.0)   03/08/18  08:30    


 


Chloride  108 mmol/L ()  H  03/08/18  08:30    


 


Carbon Dioxide  21 mmol/L (21-33)   03/08/18  08:30    


 


Anion Gap  17  (10-20)   03/08/18  08:30    


 


BUN  5 mg/dL (7-21)  L  03/08/18  08:30    


 


Creatinine  0.7 mg/dl (0.7-1.2)   03/08/18  08:30    


 


Est GFR ( Amer)  > 60   03/08/18  08:30    


 


Est GFR (Non-Af Amer)  > 60   03/08/18  08:30    


 


Random Glucose  190 mg/dL ()  H  03/08/18  08:30    


 


Calcium  9.2 mg/dL (8.4-10.5)   03/08/18  08:30    


 


Total Bilirubin  0.6 mg/dL (0.2-1.3)   03/08/18  08:30    


 


AST  24 U/L (14-36)   03/08/18  08:30    


 


ALT  31 U/L (7-56)   03/08/18  08:30    


 


Alkaline Phosphatase  58 U/L ()   03/08/18  08:30    


 


Total Protein  7.0 g/dL (5.8-8.3)   03/08/18  08:30    


 


Albumin  3.9 g/dL (3.0-4.8)   03/08/18  08:30    


 


Globulin  3.1 gm/dL  03/08/18  08:30    


 


Albumin/Globulin Ratio  1.2  (1.1-1.8)   03/08/18  08:30    














Attending/Attestation





- Attestation


I have personally seen and examined this patient.: Yes


I have fully participated in the care of the patient.: Yes


I have reviewed all pertinent clinical information, including history, physical 

exam and plan: Yes


Notes (Text): 





03/20/18 14:27





attending note;





Patient seen and examined with resident.





patient is a 62-year-old female admitted with food impaction in the throat. CT 

scan showed small focus of air.


GI and cardiothoracic surgery evaluation appreciated.


CT chest and esophagogram  was normal.


Patient was tolerating diet.





patient will be discharged home with close outpatient follow-up with GI.


Contact information given.





Patient will follow up with PMD of choice.





diagnosis;


Food impaction

## 2018-03-08 NOTE — CP.PCM.PN
<Felecia Larson - Last Filed: 03/08/18 08:29>





Subjective





- Date & Time of Evaluation


Date of Evaluation: 03/08/18


Time of Evaluation: 07:15





- Subjective


Subjective: 


GI Fellow PGY4 Progress Note


Pt seen and evaluated at bedside, pt doing well on liquids. Denies any other 

complaints at this time, no events overnight. 


ROS: A 12pt ROS was negative except as above. 








Objective





- Vital Signs/Intake and Output


Vital Signs (last 24 hours): 


 











Temp Pulse Resp BP Pulse Ox


 


 98.1 F   54 L  18   141/68   97 


 


 03/07/18 14:00  03/07/18 14:00  03/07/18 14:00  03/07/18 14:00  03/07/18 14:00








Intake and Output: 


 











 03/08/18 03/08/18





 06:59 18:59


 


Intake Total 180 


 


Balance 180 














- Medications


Medications: 


 Current Medications





Acetaminophen (Tylenol 325mg Tab)  650 mg PO Q6H PRN


   PRN Reason: Fever >100.4 F


Ampicillin Sodium/Sulbactam (Sodium 3 gm/ Sodium Chloride)  100 mls @ 200 mls/

hr IVPB Q6 [facility]


   PRN Reason: Protocol


   Last Admin: 03/08/18 06:05 Dose:  200 mls/hr


Potassium Chloride/Dextrose/Sod Cl (Potassium Chl 20 Meq In D5-1/2ns)  1,000 

mls @ 100 mls/hr IV .Q10H [facility]


   Last Admin: 03/08/18 03:40 Dose:  100 mls/hr


Pantoprazole Sodium (Protonix Ec Tab)  40 mg PO 0600 [facility]











- Labs


Labs: 


 











PT  11.8 SECONDS (9.4-12.5)   03/07/18  00:10    


 


INR  1.03  (0.93-1.08)   03/07/18  00:10    


 


APTT  28.7 Seconds (25.1-36.5)   03/07/18  00:10    














- Constitutional


Appears: Non-toxic, No Acute Distress





- Head Exam


Head Exam: ATRAUMATIC, NORMAL INSPECTION, NORMOCEPHALIC





Assessment and Plan





- Assessment and Plan (Free Text)


Assessment: 


This is a 62yF presenting with steak stuck in her throat.


1. Food impaction with resolution, recurrent


2. Gas/air proximal esophagous-no perforation or diverticulum on esophagram 








Plan: 


-Continue supportive care


-Clear liquid diet, advance to soft diet 


-PPI daily


-Initially no EGD with air in esophagous, concern for perforation, diverticulum 

or fistula, but barium esopahgram negative for any pathology 


-Appreciate surgical recommendation 


-Pt with recurrent episodes food impaction, will need EGD as an outpt and 

esophgeal biopsy to r/o EOE


-From GI perspective pt is okay for discharge home, follow up outpt for EGD 














<Adam Gross - Last Filed: 03/08/18 08:35>





Objective





- Vital Signs/Intake and Output


Vital Signs (last 24 hours): 


 











Temp Pulse Resp BP Pulse Ox


 


 98.1 F   54 L  18   141/68   97 


 


 03/07/18 14:00  03/07/18 14:00  03/07/18 14:00  03/07/18 14:00  03/07/18 14:00








Intake and Output: 


 











 03/08/18 03/08/18





 06:59 18:59


 


Intake Total 180 


 


Balance 180 














- Medications


Medications: 


 Current Medications





Acetaminophen (Tylenol 325mg Tab)  650 mg PO Q6H PRN


   PRN Reason: Fever >100.4 F


Ampicillin Sodium/Sulbactam (Sodium 3 gm/ Sodium Chloride)  100 mls @ 200 mls/

hr IVPB Q6 [facility]


   PRN Reason: Protocol


   Last Admin: 03/08/18 06:05 Dose:  200 mls/hr


Potassium Chloride/Dextrose/Sod Cl (Potassium Chl 20 Meq In D5-1/2ns)  1,000 

mls @ 100 mls/hr IV .Q10H [facility]


   Last Admin: 03/08/18 03:40 Dose:  100 mls/hr


Pantoprazole Sodium (Protonix Ec Tab)  40 mg PO 0600 [facility]











- Labs


Labs: 


 











PT  11.8 SECONDS (9.4-12.5)   03/07/18  00:10    


 


INR  1.03  (0.93-1.08)   03/07/18  00:10    


 


APTT  28.7 Seconds (25.1-36.5)   03/07/18  00:10    














Attending/Attestation





- Attestation


I have personally seen and examined this patient.: Yes


I have fully participated in the care of the patient.: Yes


I have reviewed all pertinent clinical information, including history, physical 

exam and plan: Yes


Notes (Text): 





03/08/18 08:34


62 year old female admitted after food impaction. Now resolved with medical 

management. No evidence of perforation on imaging, just small pockeet of air 

posterior to esophagus. Recommend BID PPI. Soft / dyshpagia diet. Outpatient 

egd in 6 weeks.